# Patient Record
Sex: MALE | Race: WHITE | NOT HISPANIC OR LATINO | Employment: OTHER | URBAN - METROPOLITAN AREA
[De-identification: names, ages, dates, MRNs, and addresses within clinical notes are randomized per-mention and may not be internally consistent; named-entity substitution may affect disease eponyms.]

---

## 2019-12-02 ENCOUNTER — OFFICE VISIT (OUTPATIENT)
Dept: URGENT CARE | Facility: URGENT CARE | Age: 34
End: 2019-12-02
Payer: COMMERCIAL

## 2019-12-02 ENCOUNTER — TELEPHONE (OUTPATIENT)
Dept: URGENT CARE | Facility: URGENT CARE | Age: 34
End: 2019-12-02

## 2019-12-02 ENCOUNTER — ANCILLARY PROCEDURE (OUTPATIENT)
Dept: GENERAL RADIOLOGY | Facility: CLINIC | Age: 34
End: 2019-12-02
Attending: PHYSICIAN ASSISTANT
Payer: COMMERCIAL

## 2019-12-02 VITALS
SYSTOLIC BLOOD PRESSURE: 120 MMHG | OXYGEN SATURATION: 96 % | TEMPERATURE: 100.8 F | DIASTOLIC BLOOD PRESSURE: 80 MMHG | HEART RATE: 124 BPM

## 2019-12-02 DIAGNOSIS — J18.9 COMMUNITY ACQUIRED PNEUMONIA, UNSPECIFIED LATERALITY: Primary | ICD-10-CM

## 2019-12-02 DIAGNOSIS — R50.9 FEVER IN ADULT: ICD-10-CM

## 2019-12-02 LAB
DEPRECATED S PYO AG THROAT QL EIA: NORMAL
FLUAV+FLUBV AG SPEC QL: NEGATIVE
FLUAV+FLUBV AG SPEC QL: NEGATIVE
SPECIMEN SOURCE: NORMAL
SPECIMEN SOURCE: NORMAL

## 2019-12-02 PROCEDURE — 87804 INFLUENZA ASSAY W/OPTIC: CPT | Performed by: FAMILY MEDICINE

## 2019-12-02 PROCEDURE — 71046 X-RAY EXAM CHEST 2 VIEWS: CPT

## 2019-12-02 PROCEDURE — 99203 OFFICE O/P NEW LOW 30 MIN: CPT | Performed by: PHYSICIAN ASSISTANT

## 2019-12-02 PROCEDURE — 87081 CULTURE SCREEN ONLY: CPT | Performed by: PHYSICIAN ASSISTANT

## 2019-12-02 PROCEDURE — 87880 STREP A ASSAY W/OPTIC: CPT | Performed by: FAMILY MEDICINE

## 2019-12-02 RX ORDER — AZITHROMYCIN 250 MG/1
TABLET, FILM COATED ORAL
Qty: 6 TABLET | Refills: 0 | Status: SHIPPED | OUTPATIENT
Start: 2019-12-02

## 2019-12-02 NOTE — TELEPHONE ENCOUNTER
Clinic Action Needed:No  Reason for Call: Patients mom Janice calling as she had him into [facility]  today where he was dx with pneumonia. The prescription for the Z Pac is not at the pharmacy yet and it's been over 2 hours. I explained there have been some transmission problems with sending prescriptions today and I would try to see what the [facility] is doing regarding this. [facility] answered and they said they are handing out paper copies now and would call in the Z Pac to the [facility] on [address] for [place]. I let Mom, Janice, know the UC is doing this.   Routed to: None    Rosy Dawn RN  [facility]  [facility]

## 2019-12-02 NOTE — PROGRESS NOTES
SUBJECTIVE:   Aubrey Jacobs is a 34 year old male presenting with a chief complaint of cough for around a month and now over the past several days having increased cough, fevers, chills and chest congestion.  Feel slightly SOB when coughing but not at rest.  Denies chest pain or pressure.   No calf pain or swelling.      Course of illness is worsening.    Severity moderate  Current and Associated symptoms: mild cold sx but no GI sx, rashes noted  Treatment measures tried include Tylenol/Ibuprofen, OTC Cough med, Fluids and Rest.  Predisposing factors include None.    Past Medical History:   Diagnosis Date     NO ACTIVE PROBLEMS      Current Outpatient Medications   Medication Sig Dispense Refill     azithromycin (ZITHROMAX) 250 MG tablet Two tablets first day, then one tablet daily for four days. 6 tablet 0     NO ACTIVE MEDICATIONS        Social History     Tobacco Use     Smoking status: Never Smoker     Smokeless tobacco: Never Used   Substance Use Topics     Alcohol use: No       ROS:  Review of systems negative except as stated above.    OBJECTIVE:  /80   Pulse 124   Temp 100.8  F (38.2  C) (Tympanic)   SpO2 96%   GENERAL APPEARANCE: healthy, alert and no distress  Talking in full sentences but does cough   EYES: EOMI,  PERRL, conjunctiva clear  HENT: ear canals and TM's normal.  Nose and mouth without ulcers, erythema or lesions  NECK: supple, nontender, no lymphadenopathy  RESP:  few crackles RLL  No wheezing and normal effort    CV: regular rates and rhythm, normal S1 S2, no murmur noted  NEURO: Normal strength and tone, sensory exam grossly normal,  normal speech and mentation  SKIN: no suspicious lesions or rashes    Results for orders placed or performed in visit on 12/02/19   Strep, Rapid Screen     Status: None   Result Value Ref Range    Specimen Description Throat     Rapid Strep A Screen       NEGATIVE: No Group A streptococcal antigen detected by immunoassay, await culture report.    Influenza A/B antigen     Status: None   Result Value Ref Range    Influenza A/B Agn Specimen Nasal     Influenza A Negative NEG^Negative    Influenza B Negative NEG^Negative   Beta strep group A culture     Status: None   Result Value Ref Range    Specimen Description Throat     Culture Micro No beta hemolytic Streptococcus Group A isolated        assessment/plan:  (J18.9) Community acquired pneumonia, unspecified laterality  (primary encounter diagnosis)  Comment:   Plan: azithromycin (ZITHROMAX) 250 MG tablet            On exam crackles RLL and suspicious for pneumonia and will treat.  Zithromax as directed and OTC med for sx relief and to Follow-up with PCP as needed    (R50.9) Fever in adult  Comment:   Plan: Strep, Rapid Screen, Influenza A/B antigen, XR         Chest 2 Views, Beta strep group A culture         As above

## 2019-12-03 LAB
BACTERIA SPEC CULT: NORMAL
SPECIMEN SOURCE: NORMAL

## 2023-06-19 ENCOUNTER — OFFICE VISIT (OUTPATIENT)
Dept: URGENT CARE | Facility: URGENT CARE | Age: 38
End: 2023-06-19
Payer: COMMERCIAL

## 2023-06-19 VITALS
OXYGEN SATURATION: 98 % | DIASTOLIC BLOOD PRESSURE: 78 MMHG | TEMPERATURE: 98 F | RESPIRATION RATE: 20 BRPM | SYSTOLIC BLOOD PRESSURE: 128 MMHG | HEART RATE: 78 BPM

## 2023-06-19 DIAGNOSIS — R20.2 TINGLING: ICD-10-CM

## 2023-06-19 DIAGNOSIS — R63.1 POLYDIPSIA: Primary | ICD-10-CM

## 2023-06-19 LAB
BASOPHILS # BLD AUTO: 0 10E3/UL (ref 0–0.2)
BASOPHILS NFR BLD AUTO: 0 %
EOSINOPHIL # BLD AUTO: 0.1 10E3/UL (ref 0–0.7)
EOSINOPHIL NFR BLD AUTO: 2 %
ERYTHROCYTE [DISTWIDTH] IN BLOOD BY AUTOMATED COUNT: 11.9 % (ref 10–15)
GLUCOSE BLD-MCNC: 81 MG/DL (ref 60–99)
HCT VFR BLD AUTO: 50.9 % (ref 40–53)
HGB BLD-MCNC: 16.4 G/DL (ref 13.3–17.7)
IMM GRANULOCYTES # BLD: 0 10E3/UL
IMM GRANULOCYTES NFR BLD: 0 %
LYMPHOCYTES # BLD AUTO: 1.5 10E3/UL (ref 0.8–5.3)
LYMPHOCYTES NFR BLD AUTO: 21 %
MCH RBC QN AUTO: 30.7 PG (ref 26.5–33)
MCHC RBC AUTO-ENTMCNC: 32.2 G/DL (ref 31.5–36.5)
MCV RBC AUTO: 95 FL (ref 78–100)
MONOCYTES # BLD AUTO: 0.4 10E3/UL (ref 0–1.3)
MONOCYTES NFR BLD AUTO: 6 %
NEUTROPHILS # BLD AUTO: 5.2 10E3/UL (ref 1.6–8.3)
NEUTROPHILS NFR BLD AUTO: 71 %
PLATELET # BLD AUTO: 256 10E3/UL (ref 150–450)
RBC # BLD AUTO: 5.34 10E6/UL (ref 4.4–5.9)
WBC # BLD AUTO: 7.3 10E3/UL (ref 4–11)

## 2023-06-19 PROCEDURE — 82607 VITAMIN B-12: CPT | Performed by: NURSE PRACTITIONER

## 2023-06-19 PROCEDURE — 99204 OFFICE O/P NEW MOD 45 MIN: CPT | Performed by: NURSE PRACTITIONER

## 2023-06-19 PROCEDURE — 36415 COLL VENOUS BLD VENIPUNCTURE: CPT | Performed by: NURSE PRACTITIONER

## 2023-06-19 PROCEDURE — 84443 ASSAY THYROID STIM HORMONE: CPT | Performed by: NURSE PRACTITIONER

## 2023-06-19 PROCEDURE — 85025 COMPLETE CBC W/AUTO DIFF WBC: CPT | Performed by: NURSE PRACTITIONER

## 2023-06-19 PROCEDURE — 82947 ASSAY GLUCOSE BLOOD QUANT: CPT | Performed by: NURSE PRACTITIONER

## 2023-06-19 PROCEDURE — 36416 COLLJ CAPILLARY BLOOD SPEC: CPT | Performed by: NURSE PRACTITIONER

## 2023-06-19 PROCEDURE — 80048 BASIC METABOLIC PNL TOTAL CA: CPT | Performed by: NURSE PRACTITIONER

## 2023-06-19 NOTE — PROGRESS NOTES
"Chief Complaint   Patient presents with     Urgent Care     Legs pain and tingling/ thirsty pt thinks he has diabetes      SUBJECTIVE:  Aubrey Jacobs is a 38 year old male who presents to the clinic today with complaints of increasing thirst beginning 2 weeks ago, he has concerns regarding diabetes. Also complains of tingling to bilateral lower legs from mid shin down to feet. More to R compared to L. Patient describes pain a \"ting\" at different locations. Declines back pain. Declines nausea, vomiting, fevers, diaphoresis, change in physical activity level. No changes in range of motion to legs or bilateral feet. Declines numbness to toes bilaterally. States sister had gestational diabetes, but no other family history. Denies injury to bilateral legs, bulging varicose vein pitting edema redness swelling DVT history. Patient declines taking any medications at this time. Declines eating more than usual or urinating more frequently than usual.     Past Medical History:   Diagnosis Date     NO ACTIVE PROBLEMS      azithromycin (ZITHROMAX) 250 MG tablet, Two tablets first day, then one tablet daily for four days. (Patient not taking: Reported on 6/19/2023)  NO ACTIVE MEDICATIONS,     No current facility-administered medications on file prior to visit.    Social History     Tobacco Use     Smoking status: Never     Smokeless tobacco: Never   Vaping Use     Vaping status: Not on file   Substance Use Topics     Alcohol use: No     Allergies   Allergen Reactions     Bacitra-Neomycin-Polymyxin-Hc Rash       Review of Systems   All systems negative except those listed in HPI.     EXAM:   /78   Pulse 78   Temp 98  F (36.7  C) (Tympanic)   Resp 20   SpO2 98%     Physical Exam  Vitals reviewed.   Constitutional:       Appearance: Normal appearance. He is not toxic-appearing or diaphoretic.      Comments: Anxious/nervous   HENT:      Head: Normocephalic and atraumatic.      Nose: Nose normal.   Cardiovascular:      " Rate and Rhythm: Normal rate.   Pulmonary:      Effort: Pulmonary effort is normal. No respiratory distress.      Breath sounds: Normal breath sounds. No stridor. No rhonchi.   Musculoskeletal:         General: No tenderness, deformity or signs of injury.      Cervical back: Normal range of motion.      Right lower leg: No swelling, deformity or tenderness. No edema.      Left lower leg: No swelling, deformity or tenderness. No edema.      Comments: Endorses tingling from mid shin down to feet, bilaterally. R worse than L.   Neurological:      General: No focal deficit present.      Mental Status: He is alert and oriented to person, place, and time.   Psychiatric:         Mood and Affect: Mood normal.         Behavior: Behavior normal.       Results for orders placed or performed in visit on 06/19/23   Glucose, whole blood     Status: Normal   Result Value Ref Range    Glucose Whole Blood 81 60 - 99 mg/dL     ASSESSMENT:    Polydipsia  Tingling    PLAN:    Glucose 81, normal, not diabetic  Other labs pending for tingling and thirst  Low well's criteria for DVT PE  Will call for abnormals in 2-3 days  Reccomend healthy diet exercise electrolytes stretching  Follow up with primary care for continual support and further concerns regarding diabetes.   Healthy lifestyle choices including balanced diet and water intake.    Seek emergency care if fainting, increasing weakness, become excessively sweaty, or stroke like symptoms arise.        Mona Gonzales, PACO-BC  [facility]

## 2023-06-19 NOTE — PATIENT INSTRUCTIONS
Glucose 81, normal, not diabetic  Other labs pending for tingling and thirst  Will call for abnormals in 2-3 days  Reccomend healthy diet exercise electrolytes stretching  
normal...

## 2023-06-19 NOTE — PROGRESS NOTES
Judy Shelby, [employer] student participated in the care of this patient.  I personally performed my own history physical exam shared in diagnostic decision-making and treatment plan as listed in the note below.

## 2023-06-20 LAB
ANION GAP SERPL CALCULATED.3IONS-SCNC: 13 MMOL/L (ref 7–15)
BUN SERPL-MCNC: 19.5 MG/DL (ref 6–20)
CALCIUM SERPL-MCNC: 9.5 MG/DL (ref 8.6–10)
CHLORIDE SERPL-SCNC: 103 MMOL/L (ref 98–107)
CREAT SERPL-MCNC: 1 MG/DL (ref 0.67–1.17)
DEPRECATED HCO3 PLAS-SCNC: 23 MMOL/L (ref 22–29)
GFR SERPL CREATININE-BSD FRML MDRD: >90 ML/MIN/1.73M2
GLUCOSE SERPL-MCNC: 95 MG/DL (ref 70–99)
POTASSIUM SERPL-SCNC: 4.1 MMOL/L (ref 3.4–5.3)
SODIUM SERPL-SCNC: 139 MMOL/L (ref 136–145)
TSH SERPL DL<=0.005 MIU/L-ACNC: 2.29 UIU/ML (ref 0.3–4.2)
VIT B12 SERPL-MCNC: 259 PG/ML (ref 232–1245)